# Patient Record
Sex: FEMALE | Race: BLACK OR AFRICAN AMERICAN | NOT HISPANIC OR LATINO | Employment: UNEMPLOYED | URBAN - METROPOLITAN AREA
[De-identification: names, ages, dates, MRNs, and addresses within clinical notes are randomized per-mention and may not be internally consistent; named-entity substitution may affect disease eponyms.]

---

## 2023-04-18 ENCOUNTER — PATIENT OUTREACH (OUTPATIENT)
Dept: ADMINISTRATIVE | Facility: OTHER | Age: 33
End: 2023-04-18

## 2023-04-18 NOTE — PROGRESS NOTES
CHW - Initial Contact    This Community Health Worker completed the Social Determinant of Health questionnaire with patient during clinic visit today.    Pt identified barriers of most importance are: patient identified barrier of importance stress related to everyday life. Will  revisit   Referrals to community agencies completed with patient consent outside of [facility] include: No community referral needed during clinic visit  Referrals were put through [facility] - no:   Support and Services: no support services needed during clinic visit  Other information discussed the patient needs  help with: patient discussed no other information during clinic visit   Follow up required: yes  Follow-up Outreach - Due: 4/24/2023

## 2023-04-25 ENCOUNTER — PATIENT OUTREACH (OUTPATIENT)
Dept: ADMINISTRATIVE | Facility: OTHER | Age: 33
End: 2023-04-25

## 2023-04-25 NOTE — PROGRESS NOTES
CHW - Follow Up    This Community Health Worker completed a follow up visit with patient via telephone today.  Pt reported: patient reported she is doing well no assistance is needed during phone interview.   Will reach out if assistance is needed in the future.   Community Health Worker provided: CHW spoke with patient she is doing well   Follow up required: no  No future outreach task assigned     CHW - Case Closure    This Community Health Worker spoke to patient via telephone today.   Pt reported: patient reported she is doing well no assistance is needed during phone interview.  Will reach out if assistance is needed in the future.   Pt denied any additional needs at this time and agrees with episode closure at this time.  Provided patient with Community Health Worker's contact information and encouraged her to contact this Community Health Worker if additional needs arise.

## 2023-05-03 DIAGNOSIS — E11.9 TYPE 2 DIABETES MELLITUS WITHOUT COMPLICATION: ICD-10-CM

## 2023-05-05 ENCOUNTER — PATIENT MESSAGE (OUTPATIENT)
Dept: ADMINISTRATIVE | Facility: HOSPITAL | Age: 33
End: 2023-05-05

## 2024-04-17 PROBLEM — K21.9 GASTROESOPHAGEAL REFLUX DISEASE: Status: ACTIVE | Noted: 2024-04-17

## 2024-04-17 PROBLEM — E78.5 HYPERLIPIDEMIA: Status: ACTIVE | Noted: 2024-04-17

## 2024-12-02 PROBLEM — Z34.90 PREGNANCY: Status: ACTIVE | Noted: 2024-12-02

## 2024-12-23 PROBLEM — Z87.59 HISTORY OF SEVERE PRE-ECLAMPSIA: Status: ACTIVE | Noted: 2024-12-23

## 2024-12-23 PROBLEM — R12 HEARTBURN DURING PREGNANCY, ANTEPARTUM: Status: ACTIVE | Noted: 2024-12-23

## 2024-12-23 PROBLEM — O09.521 MULTIGRAVIDA OF ADVANCED MATERNAL AGE IN FIRST TRIMESTER: Status: ACTIVE | Noted: 2024-12-23

## 2024-12-23 PROBLEM — O26.899 PREGNANCY RELATED NAUSEA, ANTEPARTUM: Status: ACTIVE | Noted: 2024-12-23

## 2024-12-23 PROBLEM — O26.899 HEARTBURN DURING PREGNANCY, ANTEPARTUM: Status: ACTIVE | Noted: 2024-12-23

## 2024-12-23 PROBLEM — Z87.51 HISTORY OF PRETERM DELIVERY: Status: ACTIVE | Noted: 2024-12-23

## 2024-12-23 PROBLEM — O09.90 HIGH RISK PREGNANCY, ANTEPARTUM: Status: ACTIVE | Noted: 2024-12-02

## 2024-12-23 PROBLEM — R11.0 PREGNANCY RELATED NAUSEA, ANTEPARTUM: Status: ACTIVE | Noted: 2024-12-23

## 2024-12-23 PROBLEM — Z98.891 HISTORY OF CESAREAN SECTION: Status: ACTIVE | Noted: 2024-12-23

## 2025-01-01 ENCOUNTER — PATIENT MESSAGE (OUTPATIENT)
Dept: ADMINISTRATIVE | Facility: OTHER | Age: 35
End: 2025-01-01

## 2025-01-06 PROBLEM — O99.211 MATERNAL MORBID OBESITY IN FIRST TRIMESTER, ANTEPARTUM: Status: ACTIVE | Noted: 2024-12-16

## 2025-01-06 PROBLEM — E66.01 MATERNAL MORBID OBESITY IN FIRST TRIMESTER, ANTEPARTUM: Status: ACTIVE | Noted: 2024-12-16

## 2025-01-06 PROBLEM — O99.211 MATERNAL MORBID OBESITY IN FIRST TRIMESTER, ANTEPARTUM: Status: ACTIVE | Noted: 2025-01-06

## 2025-01-06 PROBLEM — E66.01 MATERNAL MORBID OBESITY IN FIRST TRIMESTER, ANTEPARTUM: Status: ACTIVE | Noted: 2025-01-06

## 2025-01-08 ENCOUNTER — PATIENT MESSAGE (OUTPATIENT)
Dept: ADMINISTRATIVE | Facility: OTHER | Age: 35
End: 2025-01-08